# Patient Record
Sex: FEMALE | Race: BLACK OR AFRICAN AMERICAN | NOT HISPANIC OR LATINO | Employment: UNEMPLOYED | URBAN - METROPOLITAN AREA
[De-identification: names, ages, dates, MRNs, and addresses within clinical notes are randomized per-mention and may not be internally consistent; named-entity substitution may affect disease eponyms.]

---

## 2021-07-01 ENCOUNTER — PATIENT MESSAGE (OUTPATIENT)
Dept: ADMINISTRATIVE | Facility: OTHER | Age: 41
End: 2021-07-01

## 2022-01-25 PROBLEM — M54.50 CHRONIC LOW BACK PAIN: Status: ACTIVE | Noted: 2022-01-25

## 2022-01-25 PROBLEM — G89.29 CHRONIC LOW BACK PAIN: Status: ACTIVE | Noted: 2022-01-25

## 2022-02-03 ENCOUNTER — TELEPHONE (OUTPATIENT)
Dept: SMOKING CESSATION | Facility: CLINIC | Age: 42
End: 2022-02-03

## 2022-02-04 ENCOUNTER — TELEPHONE (OUTPATIENT)
Dept: SMOKING CESSATION | Facility: CLINIC | Age: 42
End: 2022-02-04

## 2022-08-10 PROBLEM — F17.210 CIGARETTE SMOKER: Status: ACTIVE | Noted: 2022-08-10

## 2022-08-10 PROBLEM — M54.42 MIDLINE LOW BACK PAIN WITH BILATERAL SCIATICA: Status: ACTIVE | Noted: 2022-08-10

## 2022-08-10 PROBLEM — M54.41 MIDLINE LOW BACK PAIN WITH BILATERAL SCIATICA: Status: ACTIVE | Noted: 2022-08-10

## 2023-02-15 ENCOUNTER — SOCIAL WORK (OUTPATIENT)
Dept: ADMINISTRATIVE | Facility: OTHER | Age: 43
End: 2023-02-15
Payer: COMMERCIAL

## 2023-02-15 NOTE — PROGRESS NOTES
SW received and reviewed consult placed by Radha Hand MD. However, consult was blank with no rationale or specific orders. SW messaged MD via Secure Chat to obtain additional information and see what need(s) pt may have. SW awaiting response from MD. SW will provided assistance as needed.     Manuel Egan, MSW    [phone]

## 2023-02-16 ENCOUNTER — SOCIAL WORK (OUTPATIENT)
Dept: ADMINISTRATIVE | Facility: OTHER | Age: 43
End: 2023-02-16
Payer: COMMERCIAL

## 2023-02-16 NOTE — PROGRESS NOTES
SW reviewed consult for pt regarding rental assistance and co pay assistance. SW placed a phone call to pt @ [phone] to discuss. SW informed that there is no co pay assistance available. However, pt can apply for Freecare/financial assistance via financial counselor to determine eligibility @ #[id number]. Next pt informs SW that she is at risk for facing eviction. Pt has applied for Section 8 housing but has been informed they are unable to accept new participants at this time. SW emailed pt programs for rental assistance to pt's personal email [email or web address]. Pt verbalized understanding. No other needs at this time.     Manuel Egan, MSW    [phone] (phone)  [phone] (fax)

## 2023-10-16 PROBLEM — R29.898 WEAKNESS OF BOTH LOWER EXTREMITIES: Status: ACTIVE | Noted: 2023-10-16

## 2023-10-16 PROBLEM — R25.8 CLONUS: Status: ACTIVE | Noted: 2023-10-16

## 2023-10-28 PROBLEM — M48.062 SPINAL STENOSIS OF LUMBAR REGION WITH NEUROGENIC CLAUDICATION: Status: ACTIVE | Noted: 2023-10-28

## 2023-10-28 PROBLEM — M43.16 SPONDYLOLISTHESIS AT L4-L5 LEVEL: Status: ACTIVE | Noted: 2023-10-28

## 2024-02-29 PROBLEM — M54.2 CERVICAL PAIN (NECK): Status: ACTIVE | Noted: 2024-02-29

## 2024-02-29 PROBLEM — L91.8 CUTANEOUS SKIN TAGS: Status: ACTIVE | Noted: 2024-02-29

## 2024-04-15 ENCOUNTER — PATIENT MESSAGE (OUTPATIENT)
Dept: ADMINISTRATIVE | Facility: HOSPITAL | Age: 44
End: 2024-04-15
Payer: MEDICAID

## 2024-04-28 PROBLEM — M51.36 DEGENERATIVE DISC DISEASE, LUMBAR: Status: ACTIVE | Noted: 2024-04-28

## 2024-04-29 ENCOUNTER — TELEPHONE (OUTPATIENT)
Dept: ADMINISTRATIVE | Facility: HOSPITAL | Age: 44
End: 2024-04-29
Payer: MEDICAID

## 2024-12-12 NOTE — TELEPHONE ENCOUNTER
Called to reschedule clinic appt due to clinic closure and weather-no voicemail available.  Will text patient.   No